# Patient Record
Sex: MALE | Race: BLACK OR AFRICAN AMERICAN | Employment: UNEMPLOYED | URBAN - METROPOLITAN AREA
[De-identification: names, ages, dates, MRNs, and addresses within clinical notes are randomized per-mention and may not be internally consistent; named-entity substitution may affect disease eponyms.]

---

## 2019-09-13 ENCOUNTER — OFFICE VISIT (OUTPATIENT)
Dept: ORTHOPEDIC SURGERY | Age: 29
End: 2019-09-13
Payer: OTHER GOVERNMENT

## 2019-09-13 VITALS
HEART RATE: 89 BPM | WEIGHT: 119.93 LBS | DIASTOLIC BLOOD PRESSURE: 60 MMHG | HEIGHT: 70 IN | SYSTOLIC BLOOD PRESSURE: 107 MMHG | BODY MASS INDEX: 17.17 KG/M2

## 2019-09-13 DIAGNOSIS — M54.6 CHRONIC RIGHT-SIDED THORACIC BACK PAIN: Primary | ICD-10-CM

## 2019-09-13 DIAGNOSIS — M54.5 CHRONIC LEFT-SIDED LOW BACK PAIN, WITH SCIATICA PRESENCE UNSPECIFIED: ICD-10-CM

## 2019-09-13 DIAGNOSIS — G96.191 PERINEURAL CYST: ICD-10-CM

## 2019-09-13 DIAGNOSIS — G89.29 CHRONIC RIGHT-SIDED THORACIC BACK PAIN: Primary | ICD-10-CM

## 2019-09-13 DIAGNOSIS — M79.18 MYOFASCIAL PAIN: ICD-10-CM

## 2019-09-13 DIAGNOSIS — G89.29 CHRONIC LEFT-SIDED LOW BACK PAIN, WITH SCIATICA PRESENCE UNSPECIFIED: ICD-10-CM

## 2019-09-13 PROCEDURE — 99204 OFFICE O/P NEW MOD 45 MIN: CPT | Performed by: PHYSICIAN ASSISTANT

## 2019-09-13 RX ORDER — CYCLOBENZAPRINE HCL 10 MG
10 TABLET ORAL 3 TIMES DAILY PRN
COMMUNITY
End: 2022-06-24

## 2019-09-13 RX ORDER — MELOXICAM 15 MG/1
TABLET ORAL
Qty: 30 TABLET | Refills: 1 | Status: SHIPPED | OUTPATIENT
Start: 2019-09-13

## 2019-09-13 NOTE — PROGRESS NOTES
tenderness at the midline, and trapezius. · Strength: 5/5 bilateral upper extremities   · Special Tests:    ·   Spurling's, L'Hermitte's & Liang's negative bilaterally. · Skin:There are no rashes, ulcerations or lesions in right & left upper extremities. · Reflexes: Bilaterally triceps, biceps and brachioradialis are 2+. Clonus absent bilaterally at the feet. · Additional Examinations:       · RIGHT UPPER EXTREMITY:  Inspection/examination of the right upper extremity does not show any tenderness, deformity or injury. Range of motion is full. There is no gross instability. There are no rashes, ulcerations or lesions. Strength and tone are normal.  · LEFT UPPER EXTREMITY: Inspection/examination of the left upper extremity does not show any tenderness, deformity or injury. Range of motion is full. There is no gross instability. There are no rashes, ulcerations or lesions. Strength and tone are normal.    LUMBAR/SACRAL EXAMINATION:  · Inspection: Local inspection shows no step-off or bruising. Mild scoliosis      · Palpation: He has some right-sided paraspinal muscle tenderness in the thoracolumbar spine. No focal trigger point. No focal tenderness at midline. · Range of Motion: 45 degrees of flexion, 15 degrees extension  · Strength:   Strength testing is 5/5 in all muscle groups tested. · Special Tests:   Straight leg raise and crossed SLR negative. Leg length and pelvis level.  0 out of 5 Surinder's signs. · Skin: There are no rashes, ulcerations or lesions. · Reflexes: Reflexes are symmetrically 1+ at the patellar and ankle tendons with reinforcement. Clonus absent bilaterally at the feet. · Gait & station: Normal unassisted  · Additional Examinations:   · RIGHT LOWER EXTREMITY: Inspection/examination of the right lower extremity does not show any tenderness, deformity or injury. Range of motion is full. There is no gross instability. There are no rashes, ulcerations or lesions.

## 2019-09-16 ENCOUNTER — TELEPHONE (OUTPATIENT)
Dept: ORTHOPEDIC SURGERY | Age: 29
End: 2019-09-16

## 2019-12-16 ENCOUNTER — TELEPHONE (OUTPATIENT)
Dept: ORTHOPEDIC SURGERY | Age: 29
End: 2019-12-16

## 2022-01-08 ENCOUNTER — APPOINTMENT (OUTPATIENT)
Dept: GENERAL RADIOLOGY | Age: 32
End: 2022-01-08
Payer: OTHER GOVERNMENT

## 2022-01-08 ENCOUNTER — APPOINTMENT (OUTPATIENT)
Dept: CT IMAGING | Age: 32
End: 2022-01-08
Payer: OTHER GOVERNMENT

## 2022-01-08 ENCOUNTER — HOSPITAL ENCOUNTER (EMERGENCY)
Age: 32
Discharge: ANOTHER HOSPITAL | End: 2022-01-08
Attending: EMERGENCY MEDICINE
Payer: OTHER GOVERNMENT

## 2022-01-08 VITALS
RESPIRATION RATE: 22 BRPM | DIASTOLIC BLOOD PRESSURE: 75 MMHG | HEART RATE: 76 BPM | OXYGEN SATURATION: 100 % | TEMPERATURE: 97.1 F | SYSTOLIC BLOOD PRESSURE: 99 MMHG

## 2022-01-08 DIAGNOSIS — F12.90 MARIJUANA USE: ICD-10-CM

## 2022-01-08 DIAGNOSIS — E87.20 LACTIC ACIDOSIS: ICD-10-CM

## 2022-01-08 DIAGNOSIS — R56.9 SEIZURE (HCC): Primary | ICD-10-CM

## 2022-01-08 LAB
A/G RATIO: 1.7 (ref 1.1–2.2)
ACETAMINOPHEN LEVEL: <5 UG/ML (ref 10–30)
ALBUMIN SERPL-MCNC: 4.8 G/DL (ref 3.4–5)
ALP BLD-CCNC: 77 U/L (ref 40–129)
ALT SERPL-CCNC: 27 U/L (ref 10–40)
AMORPHOUS: ABNORMAL /HPF
AMPHETAMINE SCREEN, URINE: ABNORMAL
ANION GAP SERPL CALCULATED.3IONS-SCNC: 29 MMOL/L (ref 3–16)
AST SERPL-CCNC: 26 U/L (ref 15–37)
BACTERIA: ABNORMAL /HPF
BARBITURATE SCREEN URINE: ABNORMAL
BASOPHILS ABSOLUTE: 0.1 K/UL (ref 0–0.2)
BASOPHILS RELATIVE PERCENT: 0.5 %
BENZODIAZEPINE SCREEN, URINE: ABNORMAL
BILIRUB SERPL-MCNC: <0.2 MG/DL (ref 0–1)
BILIRUBIN URINE: NEGATIVE
BLOOD, URINE: ABNORMAL
BUN BLDV-MCNC: 12 MG/DL (ref 7–20)
CALCIUM SERPL-MCNC: 9.6 MG/DL (ref 8.3–10.6)
CANNABINOID SCREEN URINE: POSITIVE
CHLORIDE BLD-SCNC: 98 MMOL/L (ref 99–110)
CLARITY: CLEAR
CO2: 14 MMOL/L (ref 21–32)
COCAINE METABOLITE SCREEN URINE: ABNORMAL
COLOR: YELLOW
CREAT SERPL-MCNC: 1 MG/DL (ref 0.9–1.3)
CRYSTALS, UA: ABNORMAL /HPF
EOSINOPHILS ABSOLUTE: 0 K/UL (ref 0–0.6)
EOSINOPHILS RELATIVE PERCENT: 0 %
ETHANOL: NORMAL MG/DL (ref 0–0.08)
GFR AFRICAN AMERICAN: >60
GFR NON-AFRICAN AMERICAN: >60
GLUCOSE BLD-MCNC: 139 MG/DL (ref 70–99)
GLUCOSE URINE: NEGATIVE MG/DL
HCT VFR BLD CALC: 45.2 % (ref 40.5–52.5)
HEMOGLOBIN: 14.2 G/DL (ref 13.5–17.5)
INFLUENZA A: NOT DETECTED
INFLUENZA B: NOT DETECTED
KETONES, URINE: ABNORMAL MG/DL
LACTIC ACID: 17.8 MMOL/L (ref 0.4–2)
LEUKOCYTE ESTERASE, URINE: NEGATIVE
LYMPHOCYTES ABSOLUTE: 0.9 K/UL (ref 1–5.1)
LYMPHOCYTES RELATIVE PERCENT: 4.9 %
Lab: ABNORMAL
MCH RBC QN AUTO: 30 PG (ref 26–34)
MCHC RBC AUTO-ENTMCNC: 31.4 G/DL (ref 31–36)
MCV RBC AUTO: 95.7 FL (ref 80–100)
METHADONE SCREEN, URINE: ABNORMAL
MICROSCOPIC EXAMINATION: YES
MONOCYTES ABSOLUTE: 1 K/UL (ref 0–1.3)
MONOCYTES RELATIVE PERCENT: 5.5 %
MUCUS: ABNORMAL /LPF
NEUTROPHILS ABSOLUTE: 16.2 K/UL (ref 1.7–7.7)
NEUTROPHILS RELATIVE PERCENT: 89.1 %
NITRITE, URINE: NEGATIVE
OPIATE SCREEN URINE: ABNORMAL
OXYCODONE URINE: ABNORMAL
PDW BLD-RTO: 13.3 % (ref 12.4–15.4)
PH UA: 5
PH UA: 5 (ref 5–8)
PHENCYCLIDINE SCREEN URINE: ABNORMAL
PLATELET # BLD: 320 K/UL (ref 135–450)
PMV BLD AUTO: 7.7 FL (ref 5–10.5)
POTASSIUM REFLEX MAGNESIUM: 4 MMOL/L (ref 3.5–5.1)
PROPOXYPHENE SCREEN: ABNORMAL
PROTEIN UA: ABNORMAL MG/DL
RBC # BLD: 4.72 M/UL (ref 4.2–5.9)
RBC UA: ABNORMAL /HPF (ref 0–4)
SALICYLATE, SERUM: <0.3 MG/DL (ref 15–30)
SARS-COV-2 RNA, RT PCR: NOT DETECTED
SODIUM BLD-SCNC: 141 MMOL/L (ref 136–145)
SPECIFIC GRAVITY UA: 1.02 (ref 1–1.03)
TOTAL PROTEIN: 7.6 G/DL (ref 6.4–8.2)
URINE REFLEX TO CULTURE: ABNORMAL
URINE TYPE: ABNORMAL
UROBILINOGEN, URINE: 0.2 E.U./DL
WBC # BLD: 18.2 K/UL (ref 4–11)
WBC UA: ABNORMAL /HPF (ref 0–5)

## 2022-01-08 PROCEDURE — 80179 DRUG ASSAY SALICYLATE: CPT

## 2022-01-08 PROCEDURE — 82077 ASSAY SPEC XCP UR&BREATH IA: CPT

## 2022-01-08 PROCEDURE — 80307 DRUG TEST PRSMV CHEM ANLYZR: CPT

## 2022-01-08 PROCEDURE — 81001 URINALYSIS AUTO W/SCOPE: CPT

## 2022-01-08 PROCEDURE — 99284 EMERGENCY DEPT VISIT MOD MDM: CPT

## 2022-01-08 PROCEDURE — 80143 DRUG ASSAY ACETAMINOPHEN: CPT

## 2022-01-08 PROCEDURE — 80053 COMPREHEN METABOLIC PANEL: CPT

## 2022-01-08 PROCEDURE — 36415 COLL VENOUS BLD VENIPUNCTURE: CPT

## 2022-01-08 PROCEDURE — 71045 X-RAY EXAM CHEST 1 VIEW: CPT

## 2022-01-08 PROCEDURE — 85025 COMPLETE CBC W/AUTO DIFF WBC: CPT

## 2022-01-08 PROCEDURE — 83605 ASSAY OF LACTIC ACID: CPT

## 2022-01-08 PROCEDURE — 96365 THER/PROPH/DIAG IV INF INIT: CPT

## 2022-01-08 PROCEDURE — 6360000002 HC RX W HCPCS

## 2022-01-08 PROCEDURE — 93005 ELECTROCARDIOGRAM TRACING: CPT | Performed by: PHYSICIAN ASSISTANT

## 2022-01-08 PROCEDURE — 80177 DRUG SCRN QUAN LEVETIRACETAM: CPT

## 2022-01-08 PROCEDURE — 6360000002 HC RX W HCPCS: Performed by: PHYSICIAN ASSISTANT

## 2022-01-08 PROCEDURE — 2580000003 HC RX 258: Performed by: PHYSICIAN ASSISTANT

## 2022-01-08 PROCEDURE — 96375 TX/PRO/DX INJ NEW DRUG ADDON: CPT

## 2022-01-08 PROCEDURE — 87636 SARSCOV2 & INF A&B AMP PRB: CPT

## 2022-01-08 PROCEDURE — 70450 CT HEAD/BRAIN W/O DYE: CPT

## 2022-01-08 RX ORDER — LORAZEPAM 2 MG/ML
1 INJECTION INTRAMUSCULAR ONCE
Status: COMPLETED | OUTPATIENT
Start: 2022-01-08 | End: 2022-01-08

## 2022-01-08 RX ORDER — LORAZEPAM 2 MG/ML
INJECTION INTRAMUSCULAR
Status: COMPLETED
Start: 2022-01-08 | End: 2022-01-08

## 2022-01-08 RX ORDER — 0.9 % SODIUM CHLORIDE 0.9 %
1000 INTRAVENOUS SOLUTION INTRAVENOUS ONCE
Status: COMPLETED | OUTPATIENT
Start: 2022-01-08 | End: 2022-01-08

## 2022-01-08 RX ADMIN — LORAZEPAM 1 MG: 2 INJECTION INTRAMUSCULAR; INTRAVENOUS at 12:49

## 2022-01-08 RX ADMIN — LORAZEPAM 1 MG: 2 INJECTION INTRAMUSCULAR at 12:49

## 2022-01-08 RX ADMIN — LEVETIRACETAM 1000 MG: 100 INJECTION, SOLUTION INTRAVENOUS at 14:37

## 2022-01-08 RX ADMIN — SODIUM CHLORIDE 1000 ML: 9 INJECTION, SOLUTION INTRAVENOUS at 14:38

## 2022-01-08 NOTE — ED PROVIDER NOTES
I independently examined and evaluated Damien Bobo. All diagnostic, treatment, and disposition decisions were made by myself in conjunction with the MAX, Syed Padilla. For all further details of the patient's emergency department visit, please see their documentation. 53-year-old male presents after a witnessed seizure at home. He apparently has a history of PTSD and is on some benzos and Keppra, normally followed by the [place]. He apparently was complaining of a headache today and then had a witnessed seizure. He was combative and postictal when EMS arrived but did improve in route. Shortly after arrival here he had another witnessed seizure and was postictal afterwards. Vitals:    01/08/22 1500   BP: 108/67   Pulse: 104   Resp: 24   Temp:    SpO2: 98%     At the time of my exam the patient is postictal, confused, somnolent. He is tachycardic. No hypoxia.     Results for orders placed or performed during the hospital encounter of 01/08/22   COVID-19 & Influenza Combo    Specimen: Nasopharyngeal Swab   Result Value Ref Range    SARS-CoV-2 RNA, RT PCR NOT DETECTED NOT DETECTED    INFLUENZA A NOT DETECTED NOT DETECTED    INFLUENZA B NOT DETECTED NOT DETECTED   CBC auto differential   Result Value Ref Range    WBC 18.2 (H) 4.0 - 11.0 K/uL    RBC 4.72 4.20 - 5.90 M/uL    Hemoglobin 14.2 13.5 - 17.5 g/dL    Hematocrit 45.2 40.5 - 52.5 %    MCV 95.7 80.0 - 100.0 fL    MCH 30.0 26.0 - 34.0 pg    MCHC 31.4 31.0 - 36.0 g/dL    RDW 13.3 12.4 - 15.4 %    Platelets 499 914 - 853 K/uL    MPV 7.7 5.0 - 10.5 fL    Neutrophils % 89.1 %    Lymphocytes % 4.9 %    Monocytes % 5.5 %    Eosinophils % 0.0 %    Basophils % 0.5 %    Neutrophils Absolute 16.2 (H) 1.7 - 7.7 K/uL    Lymphocytes Absolute 0.9 (L) 1.0 - 5.1 K/uL    Monocytes Absolute 1.0 0.0 - 1.3 K/uL    Eosinophils Absolute 0.0 0.0 - 0.6 K/uL    Basophils Absolute 0.1 0.0 - 0.2 K/uL   Comprehensive Metabolic Panel w/ Reflex to MG   Result Value Ref Range Sodium 141 136 - 145 mmol/L    Potassium reflex Magnesium 4.0 3.5 - 5.1 mmol/L    Chloride 98 (L) 99 - 110 mmol/L    CO2 14 (LL) 21 - 32 mmol/L    Anion Gap 29 (H) 3 - 16    Glucose 139 (H) 70 - 99 mg/dL    BUN 12 7 - 20 mg/dL    CREATININE 1.0 0.9 - 1.3 mg/dL    GFR Non-African American >60 >60    GFR African American >60 >60    Calcium 9.6 8.3 - 10.6 mg/dL    Total Protein 7.6 6.4 - 8.2 g/dL    Albumin 4.8 3.4 - 5.0 g/dL    Albumin/Globulin Ratio 1.7 1.1 - 2.2    Total Bilirubin <0.2 0.0 - 1.0 mg/dL    Alkaline Phosphatase 77 40 - 129 U/L    ALT 27 10 - 40 U/L    AST 26 15 - 37 U/L   Levetiracetam level   Result Value Ref Range    KEPPRA Dose Amt Unknown    Lactic acid, plasma   Result Value Ref Range    Lactic Acid 17.8 (HH) 0.4 - 2.0 mmol/L   Ethanol   Result Value Ref Range    Ethanol Lvl None Detected mg/dL   Salicylate   Result Value Ref Range    Salicylate, Serum <3.2 (L) 15.0 - 30.0 mg/dL   Acetaminophen level   Result Value Ref Range    Acetaminophen Level <5 (L) 10 - 30 ug/mL       CT HEAD WO CONTRAST   Final Result   1. No acute intracranial abnormality. XR CHEST PORTABLE   Final Result   1. No acute abnormality. Here his lab work shows leukocytosis and elevated lactic acid, consistent with recent seizure. He was given IV Ativan just after his seizure episode here. He is also been given IV Keppra. Head CT is normal, chest x-ray is normal.  His family is here and they deny any history of seizure. They state that he comes and goes at will so they are unclear of any potential ingestions or drug and alcohol use. He will need to be admitted and is a patient of the [facility]. We are attempting to arrange transfer to the [facility] at this time. Urine drug screen is pending at this time as well.          Merrill York MD  01/08/22 2136

## 2022-01-08 NOTE — ED NOTES
1517- Call was placed to transfer center for neurology at [facility], spoke with [facility], [address]  01/08/22 731 1512- Call placed to [facility], info was given, transfer center was updated      Noa March 01/08/22 100 [facility] hospitalist returned call, Dr. Viola Bryant, spoke with [facility], [address] [place] [address] neurologist, Dr. Charmayne Gerold, returned call and spoke with [facility],  [address]- PT was accepted by Dr. Mariela Macario  01/08/22 [phone]

## 2022-01-08 NOTE — ED NOTES
Pt has [facility] benefits and coverage for Transport, [facility] will be setting up transport.        Luann Garcia  01/08/22 6211

## 2022-01-08 NOTE — ED NOTES
0077- call was placed to [facility] to update, gave transport needs       Shabnam Howell  01/08/22 [address]  01/08/22 7038

## 2022-01-08 NOTE — ED NOTES
Writer has attempted to put pulse ox on pt multiple times. Pt has increased motor activity and will not leave pulse oximeter on. Pt has been observed by writer to be breathing evenly and without difficulty.      Dilshad Villagran RN  01/08/22 9971

## 2022-01-08 NOTE — ED PROVIDER NOTES
tablet by mouth every 6 hours as needed. IBUPROFEN (ADVIL;MOTRIN) 200 MG CAPS    Take 1 capsule by mouth. MELOXICAM (MOBIC) 15 MG TABLET    I po qd PRN         ALLERGIES     Patient has no known allergies. FAMILYHISTORY     No family history on file. SOCIAL HISTORY       Social History     Tobacco Use    Smoking status: Current Some Day Smoker     Packs/day: 0.50    Smokeless tobacco: Current User    Tobacco comment: in process of quiting   Substance Use Topics    Alcohol use: Not on file    Drug use: Not on file       SCREENINGS             PHYSICAL EXAM    (up to 7 for level 4, 8 or more for level 5)     ED Triage Vitals   BP Temp Temp src Pulse Resp SpO2 Height Weight   -- -- -- -- -- -- -- --       Physical Exam  Vitals and nursing note reviewed. Constitutional:       General: He is not in acute distress. Appearance: Normal appearance. He is well-developed and normal weight. He is not ill-appearing, toxic-appearing or diaphoretic. HENT:      Head: Normocephalic and atraumatic. Nose: Nose normal.   Eyes:      General:         Right eye: No discharge. Left eye: No discharge. Cardiovascular:      Rate and Rhythm: Normal rate and regular rhythm. Pulses:           Radial pulses are 2+ on the right side and 2+ on the left side. Heart sounds: Normal heart sounds. No murmur heard. No gallop. Pulmonary:      Effort: Pulmonary effort is normal. No respiratory distress. Breath sounds: Normal breath sounds. No wheezing or rales. Chest:      Chest wall: No tenderness. Musculoskeletal:         General: No deformity. Normal range of motion. Cervical back: Normal range of motion and neck supple. Right lower leg: No edema. Left lower leg: No edema. Skin:     General: Skin is warm and dry. Neurological:      General: No focal deficit present. Mental Status: He is lethargic and confused. GCS: GCS eye subscore is 4.  GCS verbal subscore is 5. GCS motor subscore is 6. Cranial Nerves: Cranial nerves are intact. Sensory: Sensation is intact. Motor: Motor function is intact. Coordination: Coordination is intact. Gait: Gait is intact. Comments: Post-ictal. Awake but drowsy. Patient in confused without focal neuro deficits. Psychiatric:         Behavior: Behavior normal. Behavior is cooperative.          DIAGNOSTIC RESULTS   LABS:    Labs Reviewed   CBC WITH AUTO DIFFERENTIAL - Abnormal; Notable for the following components:       Result Value    WBC 18.2 (*)     Neutrophils Absolute 16.2 (*)     Lymphocytes Absolute 0.9 (*)     All other components within normal limits    Narrative:     Performed at:  [facility],  [employer]   Phone [phone]   COMPREHENSIVE METABOLIC PANEL W/ REFLEX TO MG FOR LOW K - Abnormal; Notable for the following components:    Chloride 98 (*)     CO2 14 (*)     Anion Gap 29 (*)     Glucose 139 (*)     All other components within normal limits    Narrative:     [address]  [facility] tel. [phone],  Chemistry results called to and read back by Bryn Diaz RN, 01/08/2022  13:25, by Carol Weller  Performed at:  [place] [address],  [employer], [employer]   Phone [phone]   [address] - Abnormal; Notable for the following components:    Cannabinoid Scrn, Ur POSITIVE (*)     All other components within normal limits    Narrative:     Performed at:  [facility]) - [place] [address],  [email or web address]   Phone [phone]   URINE RT REFLEX TO CULTURE - Abnormal; Notable for the following components:    Ketones, Urine TRACE (*)     Blood, Urine SMALL (*)     Protein, UA TRACE (*)     All other components within normal limits    Narrative:     Performed at:  [facility]) - [facility],  [employer]   Phone [phone]   LACTIC ACID, PLASMA - Abnormal; Notable for the following components:    Lactic Acid 17.8 (*)     All other components within normal limits    Narrative:     Zach Deluca  [facility] tel. [phone],  Called to Randi Calixto RN @ 5661, 01/08/2022 13:34, by Marinda Felty  Performed at:  [facility],  [place], [employer]   Phone [phone]   SALICYLATE LEVEL - Abnormal; Notable for the following components:    Salicylate, Serum <8.2 (*)     All other components within normal limits    Narrative:     Chun Weber tel. [phone],  Chemistry results called to and read back by Addie Khalil RN, 01/08/2022  13:25, by Carlos Agustin  Performed at:  [place] [address],  [place], [employer]   Phone [phone]   ACETAMINOPHEN LEVEL - Abnormal; Notable for the following components:    Acetaminophen Level <5 (*)     All other components within normal limits    Narrative:     Performed at:  [facility] [address],  [place], [employer]   Phone [phone]   MICROSCOPIC URINALYSIS - Abnormal; Notable for the following components:    Mucus, UA 1+ (*)     Bacteria, UA 2+ (*)     Crystals, UA 3+ Uric Acid (*)     All other components within normal limits    Narrative:     Performed at:  [place] [address],  [place], [employer]   Phone [phone]    Narrative:     Performed at:  [place] [address],  [place], [employer]   Phone [phone]   LEVETIRACETAM LEVEL    Narrative:     Performed at:  [facility]  [address] [place], [place] [address]   Phone [phone]   ETHANOL    Narrative:     Chun Weber tel. [phone],  Chemistry results called to and read back by Addie Khalil RN, 01/08/2022  13:25, by Carlos Agustin  Performed at:  [facility]  [address],  [place], [place]   Phone [phone]       When ordered only abnormal lab results are displayed. All other labs were within normal range or not returned as of this dictation. EKG: When ordered, EKG's are interpreted by the Emergency Department Physician in the absence of a cardiologist.  Please see their note for interpretation of EKG. RADIOLOGY:   Non-plain film images such as CT, Ultrasound and MRI are read by the radiologist. Plain radiographic images are visualized and preliminarily interpreted by the ED Provider with the below findings:        Interpretation per the Radiologist below, if available at the time of this note:    CT HEAD WO CONTRAST   Final Result   1. No acute intracranial abnormality. XR CHEST PORTABLE   Final Result   1. No acute abnormality. No results found. PROCEDURES   Unless otherwise noted below, none     Procedures    CRITICAL CARE TIME   N/A    CONSULTS:  IP CONSULT TO NEUROLOGY      EMERGENCY DEPARTMENT COURSE and DIFFERENTIAL DIAGNOSIS/MDM:   Vitals:    Vitals:    01/08/22 1600 01/08/22 1630 01/08/22 1730 01/08/22 1803   BP:  107/71 133/70 120/81   Pulse: 72 75 75    Resp: 26      Temp:       TempSrc:       SpO2:           Patient was given the following medications:  Medications   levETIRAcetam (KEPPRA) 1,000 mg in sodium chloride 0.9 % 100 mL IVPB (0 mg IntraVENous Stopped 1/8/22 1545)   LORazepam (ATIVAN) injection 1 mg (1 mg IntraVENous Given 1/8/22 1249)   0.9 % sodium chloride bolus (0 mLs IntraVENous Stopped 1/8/22 1724)           Patient brought in today by EMS with complaints of possible seizure activity. On exam patient is postictal therefore it is difficult to obtain history. Prior to arrival he received Zofran and Narcan. He appears postictal he is afebrile breathing on room air satting at 98%. Old labs and records reviewed. Patient seen by myself as well as my attending.     On my arrival to the room the patient did appear to have a seizure activity. Lasted about 15 to 20 seconds. He appears post ictal.  He was given 1 of Ativan. He has a white count of 18 hemoglobin of 14.2. No acute electrolyte abnormalities. CO2 14. Anion gap of 29. Blood glucose of 139. Lactic acid of 17.8. Ethanol is negative. Salicylate levels negative. Chest x-ray shows no acute abnormality. Acetaminophen levels negative. CT head shows no acute intracranial abnormality. COVID is negative. FLU is negative. Urine and urine drug screen is currently pending at this time. I spoke to [place] hospitalist initially Dr. Italia Sesay who recommended talking to neurology as patient most likely can be a neurology admit. I spoke to Dr. Thanh Dawson with neurology who did accept this patient. He would like us to obtain an EKG. We'll also get an EKG. Plan at this time will be to transfer to the [facility] for neurology evaluation. Patient stable at time of transfer. Urine drug screen positive for cannabis. Urine negative for infection trace proteins and trace ketones noted. FINAL IMPRESSION      1. Seizure ([address]    2. Lactic acidosis    3. Marijuana use          DISPOSITION/PLAN   DISPOSITION        PATIENT REFERRED TO:  No follow-up provider specified.     DISCHARGE MEDICATIONS:  New Prescriptions    No medications on file       DISCONTINUED MEDICATIONS:  Discontinued Medications    No medications on file              (Please note that portions of this note were completed with a voice recognition program.  Efforts were made to edit the dictations but occasionally words are mis-transcribed.)    Kelvin Nicole PA-C (electronically signed)            Kelvin Nicole PA-C  01/08/22 1320 [facility],[address], [place]  01/08/22 Ivelisse Benitez PA-C  01/08/22 3614

## 2022-01-08 NOTE — ED NOTES
Bed: 03  Expected date:   Expected time:   Means of arrival:   Comments:  mo Purcell, [facility]  01/08/22 1242

## 2022-01-09 LAB
EKG ATRIAL RATE: 86 BPM
EKG DIAGNOSIS: NORMAL
EKG P AXIS: 57 DEGREES
EKG P-R INTERVAL: 142 MS
EKG Q-T INTERVAL: 350 MS
EKG QRS DURATION: 90 MS
EKG QTC CALCULATION (BAZETT): 418 MS
EKG R AXIS: 14 DEGREES
EKG T AXIS: 72 DEGREES
EKG VENTRICULAR RATE: 86 BPM
KEPPRA DOSE AMT: ABNORMAL
KEPPRA: <2 UG/ML (ref 6–46)

## 2022-01-09 PROCEDURE — 93010 ELECTROCARDIOGRAM REPORT: CPT | Performed by: INTERNAL MEDICINE

## 2022-01-09 NOTE — ED NOTES
Pt's family notified transport to be here at 2230 d/t cancellation. Family sts understanding.       Olaf Galvin RN  01/08/22 5735

## 2022-01-09 NOTE — ED NOTES
Pt resting in bed, confused and sleepy. Pt on monitor. Seizure pads in place. Bed low and locked. Call light in reach of family. Family in room. Side rails up x up. Family worried about \"microseizures. \" No seizure activity noted at this time. Will monitor.        Gennaro Hawkins RN  01/08/22 6676

## 2022-01-09 NOTE — ED NOTES
Family notified Pt has room, [facility], Room 657 and that transport to be here at midnight.       Andrew Menendez RN  01/08/22 8163

## 2022-06-24 ENCOUNTER — APPOINTMENT (OUTPATIENT)
Dept: CT IMAGING | Age: 32
End: 2022-06-24
Payer: OTHER GOVERNMENT

## 2022-06-24 ENCOUNTER — HOSPITAL ENCOUNTER (EMERGENCY)
Age: 32
Discharge: HOME | End: 2022-06-24
Attending: EMERGENCY MEDICINE
Payer: OTHER GOVERNMENT

## 2022-06-24 VITALS
BODY MASS INDEX: 18.51 KG/M2 | DIASTOLIC BLOOD PRESSURE: 50 MMHG | HEIGHT: 69 IN | HEART RATE: 56 BPM | SYSTOLIC BLOOD PRESSURE: 100 MMHG | RESPIRATION RATE: 18 BRPM | TEMPERATURE: 97 F | OXYGEN SATURATION: 100 % | WEIGHT: 125 LBS

## 2022-06-24 DIAGNOSIS — Z79.899 SEIZURE SECONDARY TO SUBTHERAPEUTIC ANTICONVULSANT MEDICATION (HCC): ICD-10-CM

## 2022-06-24 DIAGNOSIS — S00.83XA CONTUSION OF FACE, INITIAL ENCOUNTER: ICD-10-CM

## 2022-06-24 DIAGNOSIS — G40.919 BREAKTHROUGH SEIZURE (HCC): Primary | ICD-10-CM

## 2022-06-24 DIAGNOSIS — R56.9 SEIZURE SECONDARY TO SUBTHERAPEUTIC ANTICONVULSANT MEDICATION (HCC): ICD-10-CM

## 2022-06-24 LAB
A/G RATIO: 2.1 (ref 1.1–2.2)
ALBUMIN SERPL-MCNC: 5.2 G/DL (ref 3.4–5)
ALP BLD-CCNC: 70 U/L (ref 40–129)
ALT SERPL-CCNC: 21 U/L (ref 10–40)
ANION GAP SERPL CALCULATED.3IONS-SCNC: 19 MMOL/L (ref 3–16)
AST SERPL-CCNC: 25 U/L (ref 15–37)
BASOPHILS ABSOLUTE: 0.1 K/UL (ref 0–0.2)
BASOPHILS RELATIVE PERCENT: 0.5 %
BILIRUB SERPL-MCNC: 0.3 MG/DL (ref 0–1)
BUN BLDV-MCNC: 7 MG/DL (ref 7–20)
CALCIUM SERPL-MCNC: 10 MG/DL (ref 8.3–10.6)
CHLORIDE BLD-SCNC: 101 MMOL/L (ref 99–110)
CO2: 17 MMOL/L (ref 21–32)
CREAT SERPL-MCNC: 0.9 MG/DL (ref 0.9–1.3)
EOSINOPHILS ABSOLUTE: 0.1 K/UL (ref 0–0.6)
EOSINOPHILS RELATIVE PERCENT: 0.5 %
GFR AFRICAN AMERICAN: >60
GFR NON-AFRICAN AMERICAN: >60
GLUCOSE BLD-MCNC: 91 MG/DL (ref 70–99)
HCT VFR BLD CALC: 44.9 % (ref 40.5–52.5)
HEMOGLOBIN: 14.5 G/DL (ref 13.5–17.5)
LYMPHOCYTES ABSOLUTE: 1.9 K/UL (ref 1–5.1)
LYMPHOCYTES RELATIVE PERCENT: 16.9 %
MCH RBC QN AUTO: 29.6 PG (ref 26–34)
MCHC RBC AUTO-ENTMCNC: 32.4 G/DL (ref 31–36)
MCV RBC AUTO: 91.4 FL (ref 80–100)
MONOCYTES ABSOLUTE: 0.7 K/UL (ref 0–1.3)
MONOCYTES RELATIVE PERCENT: 6.3 %
NEUTROPHILS ABSOLUTE: 8.6 K/UL (ref 1.7–7.7)
NEUTROPHILS RELATIVE PERCENT: 75.8 %
PDW BLD-RTO: 13.4 % (ref 12.4–15.4)
PLATELET # BLD: 305 K/UL (ref 135–450)
PLATELET SLIDE REVIEW: ABNORMAL
PMV BLD AUTO: 8.2 FL (ref 5–10.5)
POTASSIUM REFLEX MAGNESIUM: 5.1 MMOL/L (ref 3.5–5.1)
RBC # BLD: 4.91 M/UL (ref 4.2–5.9)
REASON FOR REJECTION: NORMAL
REJECTED TEST: NORMAL
SLIDE REVIEW: ABNORMAL
SODIUM BLD-SCNC: 137 MMOL/L (ref 136–145)
TOTAL PROTEIN: 7.7 G/DL (ref 6.4–8.2)
VALPROIC ACID LEVEL: <2.8 UG/ML (ref 50–100)
WBC # BLD: 11.3 K/UL (ref 4–11)

## 2022-06-24 PROCEDURE — 96374 THER/PROPH/DIAG INJ IV PUSH: CPT

## 2022-06-24 PROCEDURE — 6370000000 HC RX 637 (ALT 250 FOR IP): Performed by: EMERGENCY MEDICINE

## 2022-06-24 PROCEDURE — 80164 ASSAY DIPROPYLACETIC ACD TOT: CPT

## 2022-06-24 PROCEDURE — 70450 CT HEAD/BRAIN W/O DYE: CPT

## 2022-06-24 PROCEDURE — 80053 COMPREHEN METABOLIC PANEL: CPT

## 2022-06-24 PROCEDURE — 85025 COMPLETE CBC W/AUTO DIFF WBC: CPT

## 2022-06-24 PROCEDURE — 99284 EMERGENCY DEPT VISIT MOD MDM: CPT

## 2022-06-24 PROCEDURE — 36415 COLL VENOUS BLD VENIPUNCTURE: CPT

## 2022-06-24 PROCEDURE — 6360000002 HC RX W HCPCS: Performed by: EMERGENCY MEDICINE

## 2022-06-24 RX ORDER — DIVALPROEX SODIUM 500 MG/1
500 TABLET, DELAYED RELEASE ORAL 2 TIMES DAILY
COMMUNITY

## 2022-06-24 RX ORDER — LORAZEPAM 2 MG/ML
1 INJECTION INTRAMUSCULAR ONCE
Status: COMPLETED | OUTPATIENT
Start: 2022-06-24 | End: 2022-06-24

## 2022-06-24 RX ORDER — DIAZEPAM 2 MG/1
2 TABLET ORAL EVERY 12 HOURS
COMMUNITY
End: 2022-07-05

## 2022-06-24 RX ORDER — DIVALPROEX SODIUM 500 MG/1
500 TABLET, DELAYED RELEASE ORAL ONCE
Status: COMPLETED | OUTPATIENT
Start: 2022-06-24 | End: 2022-06-24

## 2022-06-24 RX ADMIN — DIVALPROEX SODIUM 500 MG: 500 TABLET, DELAYED RELEASE ORAL at 15:00

## 2022-06-24 RX ADMIN — LORAZEPAM 1 MG: 2 INJECTION INTRAMUSCULAR; INTRAVENOUS at 12:26

## 2022-06-24 ASSESSMENT — PAIN DESCRIPTION - DESCRIPTORS: DESCRIPTORS: ACHING

## 2022-06-24 ASSESSMENT — PAIN DESCRIPTION - ORIENTATION: ORIENTATION: LEFT

## 2022-06-24 ASSESSMENT — PAIN DESCRIPTION - LOCATION: LOCATION: OTHER (COMMENT);HEAD

## 2022-06-24 ASSESSMENT — PAIN SCALES - GENERAL: PAINLEVEL_OUTOF10: 3

## 2022-06-24 NOTE — ED PROVIDER NOTES
CHIEF COMPLAINT  Seizures (seizure at home hit head, bite tonuge on right and hot left eye)      HISTORY OF PRESENT ILLNESS  Gato Mckenna is a 36 y.o. male with a history of PTSD and seizure disorder who presents to the ED complaining of seizure prior to arrival.  States he is prescribed Depakote and olanzapine. Reports seizure in the bathroom causing him to fall and hit his head prior to arrival.  Has pain above his left thigh and reports having bitten the right side of his tongue as well as his left lower lip. Denies recent illness or trauma. Claims to be compliant with his medications but is somewhat evasive about it. No report of chest pain, cough, dyspnea, fever, emesis, diarrhea, dysuria. No other complaints, modifying factors or associated symptoms. I have reviewed the following from the nursing documentation. Past Medical History:   Diagnosis Date    Seizures ([facility] [address]      History reviewed. No pertinent surgical history. History reviewed. No pertinent family history.   Social History     Socioeconomic History    Marital status:      Spouse name: Not on file    Number of children: Not on file    Years of education: Not on file    Highest education level: Not on file   Occupational History    Not on file   Tobacco Use    Smoking status: Current Some Day Smoker     Packs/day: 0.50    Smokeless tobacco: Current User    Tobacco comment: in process of quiting   Vaping Use    Vaping Use: Never used   Substance and Sexual Activity    Alcohol use: Not Currently    Drug use: Not Currently    Sexual activity: Yes   Other Topics Concern    Not on file   Social History Narrative    Not on file     Social Determinants of Health     Financial Resource Strain:     Difficulty of Paying Living Expenses: Not on file   Food Insecurity:     Worried About Running Out of Food in the Last Year: Not on file    [place] of Food in the Last Year: Not on file   Transportation Needs:     Lack of Transportation (Medical): Not on file    Lack of Transportation (Non-Medical): Not on file   Physical Activity:     Days of Exercise per Week: Not on file    Minutes of Exercise per Session: Not on file   Stress:     Feeling of Stress : Not on file   Social Connections:     Frequency of Communication with Friends and Family: Not on file    Frequency of Social Gatherings with Friends and Family: Not on file    Attends Spiritism Services: Not on file    Active Member of [address] [place] [address] [employer] or Organizations: Not on file    Attends Club or Organization Meetings: Not on file    Marital Status: Not on file   Intimate Partner Violence:     Fear of Current or Ex-Partner: Not on file    Emotionally Abused: Not on file    Physically Abused: Not on file    Sexually Abused: Not on file   Housing Stability:     Unable to Pay for Housing in the Last Year: Not on file    Number of [place] in the Last Year: Not on file    Unstable Housing in the Last Year: Not on file     No current facility-administered medications for this encounter. Current Outpatient Medications   Medication Sig Dispense Refill    divalproex (DEPAKOTE) 500 MG DR tablet Take 2,000 mg by mouth daily      diazePAM (VALIUM) 2 MG tablet Take 2 mg by mouth every 12 hours.  meloxicam (MOBIC) 15 MG tablet I po qd PRN 30 tablet 1     No Known Allergies    REVIEW OF SYSTEMS  10 systems reviewed, pertinent positives per HPI otherwise noted to be negative. PHYSICAL EXAM  BP (!) 94/54   Pulse 57   Temp 97 °F (36.1 °C)   Resp 18   SpO2 100%   GENERAL APPEARANCE: Awake and alert. Cooperative. No acute distress. HEAD: Normocephalic. Mild swelling and ecchymosis to the left eyebrow. EYES: PERRL. EOM's grossly intact. No abnormal nystagmus  ENT: Mucous membranes are moist.  Small bite wound to the left lower lip as well as to the right side of the tongue. No active bleeding. NECK: Supple, trachea midline. HEART: RRR. Normal S1, S2.  No murmurs, rubs or gallops. LUNGS: Respirations unlabored. CTAB. Good air exchange. No wheezes, rales, or rhonchi. Speaking comfortably in full sentences. ABDOMEN: Soft. Non-distended. Non-tender. No guarding or rebound. Normal Bowel sounds. EXTREMITIES: No peripheral edema. MAEE. No acute deformities. SKIN: Warm and dry. No acute rashes. NEUROLOGICAL: Alert and oriented X 3. CN II-XII intact. No gross facial drooping. Strength 5/5, sensation intact. No pronator drift. Normal coordination. PSYCHIATRIC: Normal mood and affect. LABS  I have reviewed all labs for this visit.    Results for orders placed or performed during the hospital encounter of 06/24/22   Comprehensive Metabolic Panel w/ Reflex to MG   Result Value Ref Range    Sodium 137 136 - 145 mmol/L    Potassium reflex Magnesium 5.1 3.5 - 5.1 mmol/L    Chloride 101 99 - 110 mmol/L    CO2 17 (L) 21 - 32 mmol/L    Anion Gap 19 (H) 3 - 16    Glucose 91 70 - 99 mg/dL    BUN 7 7 - 20 mg/dL    CREATININE 0.9 0.9 - 1.3 mg/dL    GFR Non-African American >60 >60    GFR African American >60 >60    Calcium 10.0 8.3 - 10.6 mg/dL    Total Protein 7.7 6.4 - 8.2 g/dL    Albumin 5.2 (H) 3.4 - 5.0 g/dL    Albumin/Globulin Ratio 2.1 1.1 - 2.2    Total Bilirubin 0.3 0.0 - 1.0 mg/dL    Alkaline Phosphatase 70 40 - 129 U/L    ALT 21 10 - 40 U/L    AST 25 15 - 37 U/L   Valproic Acid Level, Total   Result Value Ref Range    Valproic Acid Lvl <2.8 (L) 50.0 - 100.0 ug/mL   SPECIMEN REJECTION   Result Value Ref Range    Rejected Test CBCWD     Reason for Rejection see below    CBC with Auto Differential   Result Value Ref Range    WBC 11.3 (H) 4.0 - 11.0 K/uL    RBC 4.91 4.20 - 5.90 M/uL    Hemoglobin 14.5 13.5 - 17.5 g/dL    Hematocrit 44.9 40.5 - 52.5 %    MCV 91.4 80.0 - 100.0 fL    MCH 29.6 26.0 - 34.0 pg    MCHC 32.4 31.0 - 36.0 g/dL    RDW 13.4 12.4 - 15.4 %    Platelets 851 406 - 404 K/uL    MPV 8.2 5.0 - 10.5 fL    PLATELET SLIDE REVIEW Clumped     SLIDE REVIEW see below Neutrophils % 75.8 %    Lymphocytes % 16.9 %    Monocytes % 6.3 %    Eosinophils % 0.5 %    Basophils % 0.5 %    Neutrophils Absolute 8.6 (H) 1.7 - 7.7 K/uL    Lymphocytes Absolute 1.9 1.0 - 5.1 K/uL    Monocytes Absolute 0.7 0.0 - 1.3 K/uL    Eosinophils Absolute 0.1 0.0 - 0.6 K/uL    Basophils Absolute 0.1 0.0 - 0.2 K/uL           RADIOLOGY  X-RAYS:  I have reviewed radiologic plain film image(s). ALL OTHER NON-PLAIN FILM IMAGES SUCH AS CT, ULTRASOUND AND MRI HAVE BEEN READ BY THE RADIOLOGIST. CT Head WO Contrast   Final Result   No acute intracranial abnormality. Rechecks: Physical assessment performed. Resting comfortably. No further seizure activity in the ER. ED COURSE/MDM  Patient seen and evaluated. Old records reviewed. Labs and imaging reviewed and results discussed with patient. Patient with breakthrough seizure activity prior to arrival.  Valproic acid level undetectable. Suspect medication noncompliance. Patient somewhat evasive about this. Encouraged to take all of his prescribed medications and follow-up with his neurologist and PCP. New Prescriptions    No medications on file       CLINICAL IMPRESSION  1. Breakthrough seizure ([address]    2. Seizure secondary to subtherapeutic anticonvulsant medication ([address]    3. Contusion of face, initial encounter        Blood pressure (!) 94/54, pulse 57, temperature 97 °F (36.1 °C), resp. rate 18, SpO2 100 %. Leigh Callejas was discharged to home in stable condition.         Blanquita Andrade MD  06/24/22 [id number]

## 2022-07-05 ENCOUNTER — HOSPITAL ENCOUNTER (INPATIENT)
Age: 32
LOS: 1 days | Discharge: HOME | DRG: 885 | End: 2022-07-06
Attending: EMERGENCY MEDICINE | Admitting: PSYCHIATRY & NEUROLOGY
Payer: OTHER GOVERNMENT

## 2022-07-05 DIAGNOSIS — R44.3 HALLUCINATIONS: Primary | ICD-10-CM

## 2022-07-05 PROBLEM — F29 PSYCHOSIS, UNSPECIFIED PSYCHOSIS TYPE (HCC): Status: ACTIVE | Noted: 2022-07-05

## 2022-07-05 LAB
A/G RATIO: 2.1 (ref 1.1–2.2)
ACETAMINOPHEN LEVEL: <5 UG/ML (ref 10–30)
ALBUMIN SERPL-MCNC: 5.1 G/DL (ref 3.4–5)
ALP BLD-CCNC: 69 U/L (ref 40–129)
ALT SERPL-CCNC: 14 U/L (ref 10–40)
ANION GAP SERPL CALCULATED.3IONS-SCNC: 15 MMOL/L (ref 3–16)
AST SERPL-CCNC: 27 U/L (ref 15–37)
BASOPHILS ABSOLUTE: 0.1 K/UL (ref 0–0.2)
BASOPHILS RELATIVE PERCENT: 0.8 %
BILIRUB SERPL-MCNC: 0.4 MG/DL (ref 0–1)
BUN BLDV-MCNC: 5 MG/DL (ref 7–20)
CALCIUM SERPL-MCNC: 9.6 MG/DL (ref 8.3–10.6)
CHLORIDE BLD-SCNC: 97 MMOL/L (ref 99–110)
CO2: 25 MMOL/L (ref 21–32)
CREAT SERPL-MCNC: 1.4 MG/DL (ref 0.9–1.3)
EOSINOPHILS ABSOLUTE: 0.2 K/UL (ref 0–0.6)
EOSINOPHILS RELATIVE PERCENT: 2.8 %
ETHANOL: NORMAL MG/DL (ref 0–0.08)
GFR AFRICAN AMERICAN: >60
GFR NON-AFRICAN AMERICAN: 60
GLUCOSE BLD-MCNC: 120 MG/DL (ref 70–99)
HCT VFR BLD CALC: 37.4 % (ref 40.5–52.5)
HEMOGLOBIN: 12.9 G/DL (ref 13.5–17.5)
LYMPHOCYTES ABSOLUTE: 1.6 K/UL (ref 1–5.1)
LYMPHOCYTES RELATIVE PERCENT: 19.2 %
MAGNESIUM: 1.9 MG/DL (ref 1.8–2.4)
MCH RBC QN AUTO: 30.4 PG (ref 26–34)
MCHC RBC AUTO-ENTMCNC: 34.4 G/DL (ref 31–36)
MCV RBC AUTO: 88.3 FL (ref 80–100)
MONOCYTES ABSOLUTE: 1.1 K/UL (ref 0–1.3)
MONOCYTES RELATIVE PERCENT: 13.4 %
NEUTROPHILS ABSOLUTE: 5.2 K/UL (ref 1.7–7.7)
NEUTROPHILS RELATIVE PERCENT: 63.8 %
PDW BLD-RTO: 12.8 % (ref 12.4–15.4)
PLATELET # BLD: 345 K/UL (ref 135–450)
PMV BLD AUTO: 7 FL (ref 5–10.5)
POTASSIUM REFLEX MAGNESIUM: 3.5 MMOL/L (ref 3.5–5.1)
RBC # BLD: 4.24 M/UL (ref 4.2–5.9)
SALICYLATE, SERUM: <0.3 MG/DL (ref 15–30)
SARS-COV-2, NAAT: NOT DETECTED
SODIUM BLD-SCNC: 137 MMOL/L (ref 136–145)
TOTAL PROTEIN: 7.5 G/DL (ref 6.4–8.2)
VALPROIC ACID LEVEL: <2.8 UG/ML (ref 50–100)
WBC # BLD: 8.1 K/UL (ref 4–11)

## 2022-07-05 PROCEDURE — 99285 EMERGENCY DEPT VISIT HI MDM: CPT

## 2022-07-05 PROCEDURE — 80164 ASSAY DIPROPYLACETIC ACD TOT: CPT

## 2022-07-05 PROCEDURE — 80143 DRUG ASSAY ACETAMINOPHEN: CPT

## 2022-07-05 PROCEDURE — 83735 ASSAY OF MAGNESIUM: CPT

## 2022-07-05 PROCEDURE — 6370000000 HC RX 637 (ALT 250 FOR IP): Performed by: STUDENT IN AN ORGANIZED HEALTH CARE EDUCATION/TRAINING PROGRAM

## 2022-07-05 PROCEDURE — 6370000000 HC RX 637 (ALT 250 FOR IP): Performed by: EMERGENCY MEDICINE

## 2022-07-05 PROCEDURE — 82077 ASSAY SPEC XCP UR&BREATH IA: CPT

## 2022-07-05 PROCEDURE — 80053 COMPREHEN METABOLIC PANEL: CPT

## 2022-07-05 PROCEDURE — 87635 SARS-COV-2 COVID-19 AMP PRB: CPT

## 2022-07-05 PROCEDURE — 85025 COMPLETE CBC W/AUTO DIFF WBC: CPT

## 2022-07-05 PROCEDURE — 80179 DRUG ASSAY SALICYLATE: CPT

## 2022-07-05 PROCEDURE — 1240000000 HC EMOTIONAL WELLNESS R&B

## 2022-07-05 PROCEDURE — 6370000000 HC RX 637 (ALT 250 FOR IP)

## 2022-07-05 PROCEDURE — 36415 COLL VENOUS BLD VENIPUNCTURE: CPT

## 2022-07-05 RX ORDER — OLANZAPINE 10 MG/1
10 TABLET ORAL NIGHTLY PRN
COMMUNITY

## 2022-07-05 RX ORDER — DIVALPROEX SODIUM 500 MG/1
500 TABLET, DELAYED RELEASE ORAL 2 TIMES DAILY
Status: DISCONTINUED | OUTPATIENT
Start: 2022-07-05 | End: 2022-07-06 | Stop reason: HOSPADM

## 2022-07-05 RX ORDER — DIPHENHYDRAMINE HYDROCHLORIDE 50 MG/ML
50 INJECTION INTRAMUSCULAR; INTRAVENOUS EVERY 6 HOURS PRN
Status: DISCONTINUED | OUTPATIENT
Start: 2022-07-05 | End: 2022-07-06 | Stop reason: HOSPADM

## 2022-07-05 RX ORDER — LORAZEPAM 1 MG/1
1 TABLET ORAL 2 TIMES DAILY PRN
COMMUNITY

## 2022-07-05 RX ORDER — OLANZAPINE 5 MG/1
10 TABLET, ORALLY DISINTEGRATING ORAL ONCE
Status: COMPLETED | OUTPATIENT
Start: 2022-07-05 | End: 2022-07-05

## 2022-07-05 RX ORDER — LORAZEPAM 1 MG/1
1 TABLET ORAL 2 TIMES DAILY PRN
Status: DISCONTINUED | OUTPATIENT
Start: 2022-07-05 | End: 2022-07-06 | Stop reason: HOSPADM

## 2022-07-05 RX ORDER — OLANZAPINE 10 MG/1
10 TABLET ORAL NIGHTLY
Status: DISCONTINUED | OUTPATIENT
Start: 2022-07-05 | End: 2022-07-06 | Stop reason: HOSPADM

## 2022-07-05 RX ORDER — MELOXICAM 15 MG/1
15 TABLET ORAL DAILY PRN
Status: DISCONTINUED | OUTPATIENT
Start: 2022-07-05 | End: 2022-07-06 | Stop reason: HOSPADM

## 2022-07-05 RX ORDER — LORAZEPAM 2 MG/1
2 TABLET ORAL ONCE
Status: COMPLETED | OUTPATIENT
Start: 2022-07-05 | End: 2022-07-05

## 2022-07-05 RX ORDER — HYDROXYZINE 50 MG/1
50 TABLET, FILM COATED ORAL 3 TIMES DAILY PRN
Status: DISCONTINUED | OUTPATIENT
Start: 2022-07-05 | End: 2022-07-05

## 2022-07-05 RX ORDER — ACETAMINOPHEN 325 MG/1
650 TABLET ORAL EVERY 4 HOURS PRN
Status: DISCONTINUED | OUTPATIENT
Start: 2022-07-05 | End: 2022-07-06 | Stop reason: HOSPADM

## 2022-07-05 RX ORDER — POLYETHYLENE GLYCOL 3350 17 G
2 POWDER IN PACKET (EA) ORAL
Status: DISCONTINUED | OUTPATIENT
Start: 2022-07-05 | End: 2022-07-06 | Stop reason: HOSPADM

## 2022-07-05 RX ORDER — TRAZODONE HYDROCHLORIDE 50 MG/1
50 TABLET ORAL NIGHTLY PRN
Status: DISCONTINUED | OUTPATIENT
Start: 2022-07-05 | End: 2022-07-06 | Stop reason: HOSPADM

## 2022-07-05 RX ORDER — LORAZEPAM 2 MG/ML
2 CONCENTRATE ORAL ONCE
Status: DISCONTINUED | OUTPATIENT
Start: 2022-07-05 | End: 2022-07-05

## 2022-07-05 RX ORDER — OLANZAPINE 10 MG/1
10 INJECTION, POWDER, LYOPHILIZED, FOR SOLUTION INTRAMUSCULAR EVERY 8 HOURS PRN
Status: DISCONTINUED | OUTPATIENT
Start: 2022-07-05 | End: 2022-07-06 | Stop reason: HOSPADM

## 2022-07-05 RX ORDER — OLANZAPINE 5 MG/1
10 TABLET, ORALLY DISINTEGRATING ORAL EVERY 8 HOURS PRN
Status: DISCONTINUED | OUTPATIENT
Start: 2022-07-05 | End: 2022-07-06 | Stop reason: HOSPADM

## 2022-07-05 RX ADMIN — DIVALPROEX SODIUM 500 MG: 500 TABLET, DELAYED RELEASE ORAL at 21:00

## 2022-07-05 RX ADMIN — NICOTINE POLACRILEX 2 MG: 2 LOZENGE ORAL at 21:10

## 2022-07-05 RX ADMIN — OLANZAPINE 10 MG: 5 TABLET, ORALLY DISINTEGRATING ORAL at 01:31

## 2022-07-05 RX ADMIN — LORAZEPAM 2 MG: 2 TABLET ORAL at 18:33

## 2022-07-05 ASSESSMENT — PAIN - FUNCTIONAL ASSESSMENT: PAIN_FUNCTIONAL_ASSESSMENT: 0-10

## 2022-07-05 ASSESSMENT — PAIN DESCRIPTION - LOCATION: LOCATION: GENERALIZED

## 2022-07-05 ASSESSMENT — PAIN SCALES - GENERAL: PAINLEVEL_OUTOF10: 6

## 2022-07-05 NOTE — ED NOTES
Attempted to contact Demetrius Guo ([phone]), listed in pt's emergency contact's as \"spouse. \" Left JYOTI.      Rusty Bauman  07/05/22 8812

## 2022-07-05 NOTE — ED NOTES
Breakfast tray ordered for patient. Patient sleeping. Will continue to monitor patient.       Sheron Bañuelos RN  07/05/22 7052       Sheron Bañuelos RN  07/05/22 1205

## 2022-07-05 NOTE — ED NOTES
Medication list obtained from [facility] records.       Raisa Patton RN  07/05/22 39 Sabrina Perez RN  07/05/22 3559

## 2022-07-05 NOTE — ED NOTES
Patient appears asleep. Once awakes and after patient rest will assess patient. No beds available and its reported that drugs were found on patient. Patient medicated lastnight with Zyprexa therefor patient needs to sleep. Will continue to monitor patient.       Aline Gonzalez RN  07/05/22 6843 [place] Hannah Boudreaux RN  07/05/22 2898
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
## 2022-07-05 NOTE — ED NOTES
Patient remains agitated. Patient informed by Anna Rojas that he was being admitted and patient became more agitated. Patient cursing. Patient offered medications to help with his agitation. Patient refusing medications offered. Patient argumentative about everything. Patient at this time unable to be forced medications being only verbal. Patient wanting cigarette. Megan PORTILLO explained no smoking policy and offered Nicotine Patch or gum. Patient declined both and argumentative about nicotine. Megan PORTILLO attempted to calm patient. Patient made comment he does not want this nurse in room. Will continue to attempt to calm. Security near by and [employer].       Kris Lozano, MATEO  07/05/22 Devi [id number], RN  07/05/22 5412

## 2022-07-05 NOTE — ED NOTES
Level of Care Disposition: Admit    Patient was seen by ED provider and [facility]IL[facility] OF [facility] staff. The case presented to psychiatric provider on-call DANY Benson  Based on the ED evaluation and information presented to the provider by this nurse it is the recommendation of the on call psychiatric provider that inpatient hospitalization is the least restrictive environment for the patient at this time. The patient will be admitted to the inpatient unit.      Donna Gamez, RN  07/05/22 0220 [facility], RN  07/05/22 6802

## 2022-07-05 NOTE — ED PROVIDER NOTES
[address] ED  EMERGENCY DEPARTMENT ENCOUNTER      Pt Name: Angeline Figueroa  MRN: [id number]  Armsselwyngfkrystina 1990  Date of evaluation: 7/5/2022  Provider: Scotty Oneill MD    CHIEF COMPLAINT       Chief Complaint   Patient presents with    Psychiatric Evaluation     patient brought in by police, was outside with a gun, police found multiple drugs on him, hallucinating         HISTORY OF PRESENT ILLNESS   (Location/Symptom, Timing/Onset, Context/Setting, Quality, Duration, Modifying Factors, Severity)  Note limiting factors. Angeline iFgueroa is a 36 y.o. male with past medical history of seizure disorder, PTSD, depression and anxiety here today for psychiatric evaluation. The patient was brought to the emergency department today by police. They report that he was outside of apartment complex with a gun and gesticulating wildly. They ultimately found out that he was reportedly trying to \"protect his friends apartment\". The patient states that he was staying at a friend's apartment. She had left and stepped out and informed him that friends would be coming over. He notes that they came over, left, and then ultimately came back. He felt that this behavior was suspicious and became concerned that they were trying to break into the apartment. He states he got his gun and was protecting his friend's house. When police responded, the patient immediately complied and placed down his weapon. The police noted the patient's thoughts were quite scattered. He appeared to be talking to himself. He had pressured speech. In the back of the cruiser he seemed to be talking to himself and was telling the police that other people were sitting with him in the back of the squad car. They were concerned for his mental stability and brought him here for evaluation. Patient does state that he sometimes hears voices. Denies suicidal or homicidal ideations. HPI    Nursing Notes were reviewed.     REVIEW OF SYSTEMS    (2-9 systems for level 4, 10 or more for level 5)     Review of Systems    Please see HPI for pertinent positive and negative review of system findings. A full 10 system ROS was performed and otherwise negative. PAST MEDICAL HISTORY     Past Medical History:   Diagnosis Date    Seizures ([address]          SURGICAL HISTORY     No past surgical history on file. CURRENT MEDICATIONS       Previous Medications    DIAZEPAM (VALIUM) 2 MG TABLET    Take 2 mg by mouth every 12 hours. DIVALPROEX (DEPAKOTE) 500 MG DR TABLET    Take 2,000 mg by mouth daily    MELOXICAM (MOBIC) 15 MG TABLET    I po qd PRN       ALLERGIES     Gabapentin and Lamictal [lamotrigine]    FAMILY HISTORY     No family history on file. SOCIAL HISTORY       Social History     Socioeconomic History    Marital status:      Spouse name: Not on file    Number of children: Not on file    Years of education: Not on file    Highest education level: Not on file   Occupational History    Not on file   Tobacco Use    Smoking status: Current Some Day Smoker     Packs/day: 0.50    Smokeless tobacco: Current User    Tobacco comment: in process of quiting   Vaping Use    Vaping Use: Never used   Substance and Sexual Activity    Alcohol use: Not Currently    Drug use: Not Currently    Sexual activity: Yes   Other Topics Concern    Not on file   Social History Narrative    Not on file     Social Determinants of Health     Financial Resource Strain:     Difficulty of Paying Living Expenses: Not on file   Food Insecurity:     Worried About Running Out of Food in the Last Year: Not on file    [place] of Food in the Last Year: Not on file   Transportation Needs:     Lack of Transportation (Medical): Not on file    Lack of Transportation (Non-Medical):  Not on file   Physical Activity:     Days of Exercise per Week: Not on file    Minutes of Exercise per Session: Not on file   Stress:     Feeling of Stress : Not on file Social Connections:     Frequency of Communication with Friends and Family: Not on file    Frequency of Social Gatherings with Friends and Family: Not on file    Attends [employer] Services: Not on file    Active Member of Clubs or Organizations: Not on file    Attends Club or Organization Meetings: Not on file    Marital Status: Not on file   Intimate Partner Violence:     Fear of Current or Ex-Partner: Not on file    Emotionally Abused: Not on file    Physically Abused: Not on file    Sexually Abused: Not on file   Housing Stability:     Unable to Pay for Housing in the Last Year: Not on file    Number of [place] in the Last Year: Not on file    Unstable Housing in the Last Year: Not on file       SCREENINGS    [place] Coma Scale  Eye Opening: Spontaneous  Best Verbal Response: Oriented  Best Motor Response: Obeys commands  [place] Coma Scale Score: 15          PHYSICAL EXAM    (up to 7 for level 4, 8 or more for level 5)     ED Triage Vitals [07/05/22 0036]   BP Temp Temp src Heart Rate Resp SpO2 Height Weight   126/89 98.1 °F (36.7 °C) -- (!) 111 18 97 % 5' 8\" (1.727 m) 152 lb (68.9 kg)       Physical Exam    General appearance: Increased psychomotor activity  Skin:  Warm. Dry. Eye:  Extraocular movements intact. Ears, nose, mouth and throat:  Oral mucosa moist,  Neck:  Trachea midline. Heart: Slightly tachycardic  Perfusion:  intact  Respiratory:  Lungs clear to auscultation bilaterally. Respirations nonlabored. Abdominal:   Non distended. Nontender  Neurological:  Alert and oriented x 3. Moves all extremities spontaneously  Musculoskeletal:   Normal ROM, no deformities          Psychiatric: Pressured speech. Somewhat tangential.  Denies suicidal or homicidal ideations.   Endorses occasional hallucinations      DIAGNOSTIC RESULTS       Labs Reviewed   CBC WITH AUTO DIFFERENTIAL - Abnormal; Notable for the following components:       Result Value    Hemoglobin 12.9 (*) Hematocrit 37.4 (*)     All other components within normal limits   COMPREHENSIVE METABOLIC PANEL W/ REFLEX TO MG FOR LOW K - Abnormal; Notable for the following components:    Chloride 97 (*)     Glucose 120 (*)     BUN 5 (*)     CREATININE 1.4 (*)     GFR Non- 60 (*)     Albumin 5.1 (*)     All other components within normal limits   SALICYLATE LEVEL - Abnormal; Notable for the following components:    Salicylate, Serum <3.7 (*)     All other components within normal limits   ACETAMINOPHEN LEVEL - Abnormal; Notable for the following components:    Acetaminophen Level <5 (*)     All other components within normal limits   VALPROIC ACID LEVEL, TOTAL - Abnormal; Notable for the following components:    Valproic Acid Lvl <2.8 (*)     All other components within normal limits   COVID-19, RAPID   ETHANOL   MAGNESIUM   URINE DRUG SCREEN       Interpretation per the Radiologist below, if obtained/available at the time of this note:    No orders to display       All other labs/imaging were within normal range or not returned as of this dictation. EMERGENCY DEPARTMENT COURSE and DIFFERENTIAL DIAGNOSIS/MDM:   Vitals:    Vitals:    07/05/22 0036   BP: 126/89   Pulse: (!) 111   Resp: 18   Temp: 98.1 °F (36.7 °C)   SpO2: 97%   Weight: 152 lb (68.9 kg)   Height: 5' 8\" (1.727 m)       Patient presents to the emergency department today after being picked up by the police for gesticulating wildly with a gun in front of apartment complex. He states he felt like people were trying to break into his friend's apartment. He was not trying to hurt himself or others but rather to protect his friend's apartment. Significantly pressured speech here. Increased psychomotor activity. Mild tachycardia. Denies substance abuse but overall appearance highly suspicious for sympathomimetic syndrome and likely methamphetamine abuse. Urinalysis and urine drug screen pending at present.   Appears to be noncompliant with his Depakote. Resting comfortably at present. Very mild elevation of the patient's creatinine. Drinking water here. Medically cleared awaiting further observation and psychiatric disposition    MDM    CONSULTS     None    Critical Care:   None    REASSESSMENT          PROCEDURE     Unless otherwise noted below, none     Procedures      FINAL IMPRESSION      1. Hallucinations            DISPOSITION/PLAN   DISPOSITION          PATIENT REFERRED TO:  No follow-up provider specified. DISCHARGE MEDICATIONS:  New Prescriptions    No medications on file     Controlled Substances Monitoring:     No flowsheet data found.     (Please note that portions of this note were completed with a voice recognition program.  Efforts were made to edit the dictations but occasionally words are mis-transcribed.)    Shirley Santos MD (electronically signed)  Attending Emergency Physician            Raghav Doll MD  07/05/22 2412

## 2022-07-05 NOTE — ED NOTES
Patient awoken and VS obtained. Patient made growling noises at this nurse and screamed when awoken. Patient was able to be calmed down.       Srinivas Conley RN  07/05/22 [address], RN  07/05/22 4869

## 2022-07-05 NOTE — ED NOTES
Attempted to evaluate the patient, patient unable to remain awake for evaluation process.       Cuco Tipton RN  07/05/22 2017

## 2022-07-05 NOTE — ED NOTES
Appears to be asleep in treatment room. His eyes are closed, and respiration is even, and easy. Appears to be in no distress. Will continue to monitor.       Rosaar Para  07/05/22 7206

## 2022-07-05 NOTE — ED NOTES
Bed ready on [facility]. Notified them of Ativan. Security and [employer] LPC taking patient to [facility]. Patient on way out screamed to nurse Curse words and said \"I don't like her I hope she dies and I hope she dies a horrible death. \" Patient left KAREN.       Evette Kwong RN  07/05/22 8244

## 2022-07-05 NOTE — ED NOTES
Patient continues to be agitated, cursing at this staff member calling me \"fucking bitch\". Patient staff splitting and focused on this nurse. Patient now has bed on small side of [facility]. New order obtained from ED physician Dr. Louise Alonso for [address] [place] [address]. Ativan 2 mg po given per order. See AMANDEEP Neves, RN  07/05/22 1781 [facility], RN  07/05/22 9072

## 2022-07-05 NOTE — ED NOTES
Report called to [facility]. Cindy Lemos looking to see if patient can be placed in better fitting room on small side.       Areli Mohan RN  07/05/22 [address], RN  07/05/22 2881

## 2022-07-05 NOTE — ED NOTES
Spoke with [place] about possession of gun. Reports gun is in custody.       Evette Kwong RN  07/05/22 [address] [address], RN  07/05/22 3582

## 2022-07-05 NOTE — ED NOTES
Pt remains restless in assigned tx room. Warm blankets provided. Will continue to monitor.      97 [facility]  07/05/22 1024

## 2022-07-05 NOTE — ED NOTES
Appears to be resting in treatment room. No apparent distress. Will continue to monitor.        [facility]  07/05/22 2943

## 2022-07-05 NOTE — ED NOTES
Pt appears to be somewhat restless, and wakes intermittently. Will continue to monitor.      Eitan Garcia  07/05/22 6181

## 2022-07-06 VITALS
HEIGHT: 68 IN | TEMPERATURE: 97.7 F | HEART RATE: 77 BPM | SYSTOLIC BLOOD PRESSURE: 103 MMHG | RESPIRATION RATE: 16 BRPM | BODY MASS INDEX: 23.04 KG/M2 | OXYGEN SATURATION: 100 % | WEIGHT: 152 LBS | DIASTOLIC BLOOD PRESSURE: 75 MMHG

## 2022-07-06 PROBLEM — M41.50 DEGENERATIVE SCOLIOSIS: Status: ACTIVE | Noted: 2022-07-06

## 2022-07-06 PROBLEM — F43.10 PTSD (POST-TRAUMATIC STRESS DISORDER): Status: ACTIVE | Noted: 2022-07-06

## 2022-07-06 PROCEDURE — 6370000000 HC RX 637 (ALT 250 FOR IP)

## 2022-07-06 PROCEDURE — 99221 1ST HOSP IP/OBS SF/LOW 40: CPT

## 2022-07-06 PROCEDURE — 5130000000 HC BRIDGE APPOINTMENT

## 2022-07-06 PROCEDURE — 99234 HOSP IP/OBS SM DT SF/LOW 45: CPT | Performed by: PSYCHIATRY & NEUROLOGY

## 2022-07-06 RX ADMIN — NICOTINE POLACRILEX 2 MG: 2 LOZENGE ORAL at 11:46

## 2022-07-06 RX ADMIN — DIVALPROEX SODIUM 500 MG: 500 TABLET, DELAYED RELEASE ORAL at 12:11

## 2022-07-06 RX ADMIN — DIVALPROEX SODIUM 500 MG: 500 TABLET, DELAYED RELEASE ORAL at 16:05

## 2022-07-06 RX ADMIN — LORAZEPAM 1 MG: 1 TABLET ORAL at 12:35

## 2022-07-06 SDOH — ECONOMIC STABILITY: HOUSING INSECURITY

## 2022-07-06 SDOH — ECONOMIC STABILITY: TRANSPORTATION INSECURITY

## 2022-07-06 SDOH — ECONOMIC STABILITY: INCOME INSECURITY

## 2022-07-06 SDOH — HEALTH STABILITY: PHYSICAL HEALTH

## 2022-07-06 ASSESSMENT — SLEEP AND FATIGUE QUESTIONNAIRES
SLEEP PATTERN: DIFFICULTY FALLING ASLEEP;INSOMNIA;RESTLESSNESS;NIGHTMARES/TERRORS
DO YOU HAVE DIFFICULTY SLEEPING: YES
AVERAGE NUMBER OF SLEEP HOURS: 3
DO YOU USE A SLEEP AID: NO

## 2022-07-06 ASSESSMENT — ENCOUNTER SYMPTOMS: BACK PAIN: 1

## 2022-07-06 NOTE — H&P
Hospital Medicine History & Physical      PCP: Yarely Blanco MD    Date of Admission: 7/5/2022    Date of Service: Pt seen/examined on 7/6/2022      Chief Complaint:    Chief Complaint   Patient presents with    Psychiatric Evaluation     patient brought in by police, was outside with a gun, police found multiple drugs on him, hallucinating         History Of Present Illness: The patient is a 36 y.o. male with degenerative scoliosis and hx of seizures who presented to [facility] for psychosis. Patient was seen and evaluated in the ED by the ED medical provider, patient was medically cleared for admission to [facility]. This note serves as an admission medical H&P. Tobacco use: Daily vaping. States he will occasionally smoke cigarettes. ETOH use: None since 7453  Illicit drug use: THC, has medical card    Patient states he is in pain from his degenerative scoliosis. He states he takes tylenol for this and medical THC which help. He is very anxious and agitated because he does not know how long he will be kept here and he states his service dog is in a cage in the camper he was staying in and he is not sure if anyone has been able to check on it. I spoke to his RN who will reach out to the psychiatrist about this. He denies other medical complaints. Past Medical History:        Diagnosis Date    Seizures [facility])        Past Surgical History:    History reviewed. No pertinent surgical history. Medications Prior to Admission:    Prior to Admission medications    Medication Sig Start Date End Date Taking? Authorizing Provider   LORazepam (ATIVAN) 1 MG tablet Take 1 mg by mouth 2 times daily as needed for Anxiety.    Yes Historical Provider, MD   OLANZapine (ZYPREXA) 10 MG tablet Take 10 mg by mouth nightly as needed   Yes Historical Provider, MD   divalproex (DEPAKOTE) 500 MG DR tablet Take 500 mg by mouth 2 times daily Take 2 tablets 2 x day    Historical Provider, MD   meloxicam (MOBIC) 15 MG tablet I po qd PRN 9/13/19   Elisha Patel PA-C       Allergies:  Gabapentin and Lamictal [lamotrigine]    Social History:  The patient currently lives in a camper. States he was supposed to be moving to [place] today to live with a [employer] friend. TOBACCO:   reports that he has been smoking. He has been smoking about 0.50 packs per day. His smokeless tobacco use includes snuff and chew. ETOH:   has no history on file for alcohol use. Family History:   Positive as follows:    History reviewed. No pertinent family history. REVIEW OF SYSTEMS:       Constitutional: Negative for fever   HENT: Negative for sore throat   Eyes: Negative for redness   Respiratory: Negative  for dyspnea, cough   Cardiovascular: Negative for chest pain   Gastrointestinal: Negative for vomiting, diarrhea   Genitourinary: Negative for hematuria   Musculoskeletal: Negative for arthralgias. + Chronic back pain  Skin: Negative for rash   Neurological: Negative for syncope    Hematological: Negative for easy bruising/bleeding   Psychiatric/Behavorial: Per psychiatry team evaluation     PHYSICAL EXAM:    /75   Pulse 77   Temp 97.7 °F (36.5 °C) (Oral)   Resp 16   Ht 5' 8\" (1.727 m)   Wt 152 lb (68.9 kg)   SpO2 100%   BMI 23.11 kg/m²     Gen: Moderate distress. Alert. Anxious and agitated. Eyes: PERRL. No sclera icterus. No conjunctival injection. ENT: No discharge. Pharynx clear. Neck: No JVD. Trachea midline. Resp: No accessory muscle use. No crackles. No wheezes. No rhonchi. CV: Tachycardic rate. Regular rhythm. No murmur. No rub. No edema. GI: Non-tender. Non-distended. Normal bowel sounds. Skin: Warm and dry. No nodule on exposed extremities. No rash on exposed extremities. M/S: No cyanosis. No joint deformity. No clubbing. Neuro: Awake. No focal neurologic deficit on exam.  Cranial nerves II through XII intact. Patient is able to ambulate without difficulty.   Psych: Per psychiatry team evaluation CBC:   Recent Labs     07/05/22 0056   WBC 8.1   HGB 12.9*   HCT 37.4*   MCV 88.3        BMP:   Recent Labs     07/05/22 0056      K 3.5   CL 97*   CO2 25   BUN 5*   CREATININE 1.4*     LIVER PROFILE:   Recent Labs     07/05/22 0056   AST 27   ALT 14   BILITOT 0.4   ALKPHOS 69       CULTURES  Results for Allan Virgen (MRN [id number]) as of 7/6/2022 13:08   Ref.  Range 7/5/2022 00:56   SARS-CoV-2, NAAT Latest Ref Range: Not Detected  Not Detected       EKG:  I have reviewed the EKG with the following interpretation:   NA    RADIOLOGY  No orders to display        ASSESSMENT/PLAN:  #Psychosis  - per psychiatry team    #RAINA    -Baseline Cr < 1  -1.4 on admission  -No fluids were given in the ED  -Recheck BMP    #Degenerative scoliosis  -Home mobic held with RAINA  -States he takes tylenol and medical THC  -Continue tylenol here    #Seizure disorder  -States he was diagnosed with this at the [place] in January  -Continue depakote  -Seizure precautions    #Medical THC  -For above    #Daily vaping and nicotine dependence  -Recommend cessation  -Declines nicotine patch    Jalen Richards PA-C  7/6/2022 1:08 PM

## 2022-07-06 NOTE — BH NOTE
[facility]  Discharge Note    Pt discharged with followings belongings:   Dental Appliances: None  Vision - Corrective Lenses: None  Hearing Aid: None  Jewelry: Necklace  Body Piercings Removed: N/A  Clothing: Shirt,Socks,Shorts,Undergarments,Footwear  Other Valuables: Money,Wallet,Keys   Valuables returned to patient. Patient education on aftercare instructions: yes, by Celestina Contreras LPN  Information faxed [address] [place] [address] Drive [phone]  by Demetrius Drummond LPN  at 5:37 PM .Patient verbalize understanding of AVS:  yes. Status EXAM upon discharge:  Mental Status and Behavioral Exam  Normal: No  Level of Assistance: Independent/Self  Facial Expression: Hostile,Sad,Worried  Affect: Unstable  Level of Consciousness: Alert  Frequency of Checks: 4 times per hour, close  Mood:Normal: No  Mood: Labile,Angry,Irritable  Motor Activity:Normal: No  Motor Activity: Increased  Eye Contact: Good  Observed Behavior: Aggressive,Threatening,Agitated,Guarded  Sexual Misconduct History: Past - no  Preception: Holman to person,[place] to time,[place] to place,[place] to situation  Attention:Normal: No  Attention: Hyperalert  Thought Processes: Flight of ideas  Thought Content:Normal: No  Thought Content: Delusions,Paranoia,Preoccupations  Depression Symptoms: No problems reported or observed. Anxiety Symptoms: Generalized  Noreen Symptoms: Pressured speech,Increased energy,Labile,Poor judgment,Psychomotor agitation  Hallucinations: Auditory (comment),Visual (comment)  Delusions: Yes  Delusions: Paranoid,Persecutory  Memory:Normal: No  Memory: Poor recent  Insight and Judgment: No  Insight and Judgment: Poor judgment,Poor insight      Metabolic Screening:    No results found for: LABA1C    No results found for: CHOL  No results found for: TRIG  No results found for: HDL  No components found for: LDLCAL  No results found for: [address] [facility] Appointment completed: Reviewed Discharge Instructions with patient.     Patient verbalizes understanding and agreement with the discharge plan using the teachback method.      Referral for Outpatient Tobacco Cessation Counseling, upon discharge (essence X if applicable and completed):    ( )  Hospital staff assisted patient to call Quit Line or faxed referral                                   during hospitalization                  ( )  Recognizing danger situations (included triggers and roadblocks), if not completed on admission                    ( )  Coping skills (new ways to manage stress, exercise, relaxation techniques, changing routine, distraction), if not completed on admission                                                           ( )  Basic information about quitting (benefits of quitting, techniques in how to quit, available resources, if not completed on admission  ( ) [facility]   (x ) Patient refused referral  (x ) Patient refused counseling  (x ) Patient refused smoking cessation medication upon discharge    Vaccinations (essence X if applicable and completed):  ( ) Patient states already received influenza vaccine elsewhere  ( ) Patient received influenza vaccine during this hospitalization  (x ) Patient refused influenza vaccine at this time
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
## 2022-07-06 NOTE — PROGRESS NOTES
Patient Has been mostly lying in his bed, with his head covered up. Writer went into patient's room to give him his scheduled medications. Patient did allow staff, to obtain his vitals. Patient initially declined to talk. Patient then yelled out,\"I not doing anything until you tell me why I was commited to this place. \" Maya Terry got a copy of his statement of belief & read it to him. Patient started yelling,\" It's all a bunch of god damn lies. I wasn't waving a gun. I had the gun on me, in the back of my pants. \" Patient was illogical & having difficulty understanding what writer was saying. Patient initially declined to take his scheduled depakote & zyprexa. \" I want you all to see me get really bad, when I don't take my meds & then you will realize that I wasn't that bad when I came in. \"  Writer explained to patient that Adriano Amezcua had zyprexa & depakote to give him & explained what zyprexa was twice, because he stated that he doesn't take zyprexa. Patient yelled,\"Just give me the meds & I'll show you that they don't help. \" Patient put the 2 pills in his hand & said,\"You lied, my depakote isn't 2 different pills. \" Patient was again instructed that 1 was depakote & 1 was zyprexa. . Patient finally took the depakote & not the zyprexa, at 21:00. After staff left, patient started yelleng in his room & it sounded like he was moving furniture. Writer & another staff checked on him & patient stated,\"What do you want? \" Patient was instructed that we had to make sure he was safe. No further out burst. Writer was unable to complete patient's admission, due to patient being labile, easily agitated, with disorganized thoughts.  Haleigh Gracia R.N.
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
## 2022-07-06 NOTE — H&P
HISTORY AND PHYSICAL             Date: 7/6/2022        Patient Name: Emiliano Sheth     YOB: 1990      Age:  36 y.o. Chief Complaint     Chief Complaint   Patient presents with    Psychiatric Evaluation     patient brought in by police, was outside with a gun, police found multiple drugs on him, hallucinating          History Obtained From   patient, electronic medical record    History of Present Illness     Emiliano Sheth is a 36year old [address] [facility] who was brought to the hospital on a statement of belief by police. Statement of belief was dated on 7/4 and timed at 12:30 PM.  According to the statement of belief, he was found outside of an apartment building with a firearm, yelling. He was in possession of MJ, \"wax\" and a white crystal substance. He said that he observed people breaking into his friend's apartment and saw people with him inside the police cruiser. He was brought to the hospital seen in the [facility]. Where he was observed to be agitated, shouting and irritable. He was held in the [facility] due to lack of beds. He finally slept, and awoke at around noon on 7/6. Today, he is able to be interviewed and is able to be calm. Initially, he was upset because he did not know why he was being held. Once this information was given, he calmly was able to be interviewed. He recalls being at the apartment building and being picked up by the police. He recalls yelling at the police \"because I'm a Black man in [facility],\" and had a gun because \"it's legal for me to have one. \"  He does not recall being paranoid, seeing things. He admits to using medical marijuana and \"dab. \"    Today he denies paranoia, hallucinations, symptoms of depression, boo,  Suicidal or homicidal ideation. He requests to be discharged. He has been offered a job in [employer] [place] [employer], [place] by a fellow [address] [place] [address] at [employer].     Past Medical History     Past Medical History:   Diagnosis Date    Chronic back pain  Depression     Medical marijuana use     PTSD (post-traumatic stress disorder)     Seizures (HCC)       Past Psych History:  History of depression, ? Bipolar disorder?, and PTSD. Denies psychiatric hospitalizations or suicide attempts. Jennifer  psychiatric care at the [facility]    Past Surgical History   History reviewed. No pertinent surgical history. Medications Prior to Admission     Prior to Admission medications    Medication Sig Start Date End Date Taking? Authorizing Provider   LORazepam (ATIVAN) 1 MG tablet Take 1 mg by mouth 2 times daily as needed for Anxiety. Yes Historical Provider, MD   OLANZapine (ZYPREXA) 10 MG tablet Take 10 mg by mouth nightly as needed   Yes Historical Provider, MD   divalproex (DEPAKOTE) 500 MG DR tablet Take 500 mg by mouth 2 times daily Take 2 tablets 2 x day    Historical Provider, MD   meloxicam (MOBIC) 15 MG tablet I po qd PRN 9/13/19   Chemo Kevin PA-C        Allergies   Gabapentin and Lamictal [lamotrigine]    Social History     Social History     Tobacco History     Smoking Status  Current Some Day Smoker Smoking Frequency  0.5 packs/day    Smokeless Tobacco Use  Current User Smokeless Tobacco Type  Snuff, Chew    Tobacco Comment  in process of quiting          Alcohol History     Alcohol Use Status  Not Asked Comment  SEMAJ, patient was labile & uncooperative          Drug Use     Drug Use Status  Not Currently Types  Marijuana Estil Catena) Comment  Patient reported having a medical marijuana card          Sexual Activity     Sexually Active  Not Asked Comment  SEMAJ, patient was labile & uncooperative              From [place]. His mother is an . He went to private school. 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 He is now disabled and receives $500 a month in disability. He has PTSD due to having seen a  fall out of an airplane. He is  and his ex has his kids.   In January he started having seizures and then was no longer able to drive. He then was no longer able to see his kids. 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 Family History   History reviewed. No pertinent family history. Review of Systems   Review of Systems   Musculoskeletal: Positive for back pain. Psychiatric/Behavioral: Positive for agitation and behavioral problems. Negative for dysphoric mood and suicidal ideas. All other systems reviewed and are negative.       Physical Exam   /75   Pulse 77   Temp 97.7 °F (36.5 °C) (Oral)   Resp 16   Ht 5' 8\" (1.727 m)   Wt 152 lb (68.9 kg)   SpO2 100%   BMI 23.11 kg/m²     MENTAL STATUS EXAM      General appearance:  [x]  appears age, []  appears older than stated age,     [x]  adequately dressed and groomed, [] disheveled,                []  in no acute distress, [x] appears mildly distressed at the beginning of the interview but calmed down promptly    []  Other:      MUSCULOSKELETAL:   Gait:   [x] normal, [] antalgic, [] unsteady, [] in bed, gait not evaluated   Station:  [x] erect, [] sitting, [] recumbent, [] other        Strength/tone:  [x] muscle strength and tone appear consistent with age and condition     [] atrophy      [] abnormal movements  PSYCHIATRIC:    Relatedness:  [x] cooperative, [] guarded, [] indifferent, [] hostile,      [] sedated  Speech:  [x] normal prosody, [] pressured, [] decreased volume,    [] slurred, [] halting, [] slowed, [] Loud     [] echolalia, [] incoherent, [] stuttering   Eye contact:  [x] direct, [] avoidant, [] intense  Kinetics:  [x] normal, [] increased, [] decreased  Mood:   [] euthymic, [] depressed, [x] anxious, [] irritable,     [] labile  Affect:   [] normal range, [x] constricted, [] depressed, [] anxious,     [] angry, [] blunted, [] flat  Hallucinations  [x] denies, [] auditory,  [] visual,  [] olfactory, [] tactile  Delusions  [x] none, [] grandiose,  [] jealous,  [] persecutory,  [] somatic,     [] bizarre  Preoccupations  [] none, [] violence, [] obsessions, [] other     Suicidal ideation  [x] denies, [] endorses  Homicidal ideation [x] denies, [] endorses  Thought process: [x] logical, [] circumstantial, [] tangential,      [] concrete, [] disorganized  Associations:  [x] logical and coherent, [] loosening  Insight:   [] good, [x] fair, [] poor  Judgment:  [] good, [x] fair, [] poor  Attention and concentration:     [x] intact, [] limited, [] able to focus on interview,     [] grossly impaired  Orientation:  [x] person, place, time, situation     [] disoriented to:     Memory:  Remote memory [x] intact, [] impaired     Recent memory  [x] intact, [] impaired            Labs    No results found for this or any previous visit (from the past 24 hour(s)). Imaging/Diagnostics Last 24 Hours   No results found. Assessment      Hospital Problems           Last Modified POA    * (Principal) Psychosis, unspecified psychosis type ([facility] [address] 7/6/2022 Yes    Degenerative scoliosis 7/6/2022 Yes    Seizure disorder ([facility] [address] 7/6/2022 Yes    Medical marijuana use 7/6/2022 Yes    Current every day vaping 7/6/2022 Yes    Nicotine dependence, uncomplicated 1/8/3835 Yes          Patient was picked up by police in a psychotic state the night of 7/3. He awakens today is a calm state with no psychosis. He is neither manic nor depressed and is neither suicidal nor homicidal.  He admits to having used synthetic cannabinoids; this was likely the cause of his psychosis. At this time, he asks for discharge. He is calm and is OK to discharge. He is future-oriented and is well-connected at the [place]. He was instructed to continue his current medications and cautioned him about cannabinoids. Plan   1. Admit/Discharge  2. Continue meds  3.  Follow-up at the [place]    Consultations Ordered:  IP CONSULT TO HOSPITALIST    Electronically signed by Jazmín Galeas MD on 7/6/22 at 2:44 PM EDT

## 2022-07-06 NOTE — PROGRESS NOTES
Behavioral Services  Medicare Certification Upon Admission    I certify that this patient's inpatient psychiatric hospital admission is medically necessary for:    [x] (1) Treatment which could reasonably be expected to improve this patient's condition,       [x] (2) Or for diagnostic study;     AND     [x](2) The inpatient psychiatric services are provided while the individual is under the care of a physician and are included in the individualized plan of care. Estimated length of stay/service 4 days    Plan for post-hospital care:  Outpatient [address]    Electronically signed by Jazmín Galeas MD on 7/6/2022 at 2:07 PM

## 2022-07-06 NOTE — PROGRESS NOTES
[facility]  Admission Note     Admission Type:   Admission Type: Involuntary    Reason for admission:  Reason for Admission: Psychosis    PATIENT STRENGTHS:       Patient Strengths and Limitations:       Addictive Behavior:   Addictive Behavior  In the Past 3 Months, Have You Felt or Has Someone Told You That You Have a Problem With  :  (SEMAJ-Patient was labile, easily agitated & uncooperative)    Medical Problems:   Past Medical History:   Diagnosis Date    Seizures ([address]        Status EXAM:  Mental Status and Behavioral Exam  Normal: No  Level of Assistance: Independent/Self  Facial Expression: Hostile,Exaggerated  Affect: Unstable  Level of Consciousness: Alert  Frequency of Checks: 4 times per hour, close  Mood:Normal: No  Mood: Labile,Suspicious,Anxious,Irritable  Motor Activity:Normal: No  Motor Activity: Agitated  Eye Contact: Fair  Observed Behavior: Agitated,Hostile,Preoccupied  Sexual Misconduct History:  (SEMAJ, patient was labile & uncooperative)  Preception: [place] to person (SEMAJ, patient was labile & uncooperative)  Attention:Normal: No  Attention: Distractible,Unable to concentrate  Thought Processes: Flight of ideas  Thought Content:Normal: No  Thought Content: Paranoia,Preoccupations  Depression Symptoms: No problems reported or observed.   Anxiety Symptoms: Generalized  Noreen Symptoms: Flight of ideas  Hallucinations: None (Patient deneied, it was reported that patient was having auditory hallucinations, prior to admission)  Delusions: Yes  Delusions: Paranoid,Persecutory  Memory:Normal:  (SEMAJ, patient was labile & uncooperative)  Insight and Judgment: No  Insight and Judgment: Poor judgment,Poor insight,Unrealistic    Tobacco Screening:  Practical Counseling, on admission, essence X, if applicable and completed (first 3 are required if patient doesn't refuse):            ( )  Recognizing danger situations (included triggers and roadblocks)                    ( )  Coping skills (new ways to manage stress, exercise, relaxation techniques, changing routine, distraction)                                                           ( )  Basic information about quitting (benefits of quitting, techniques in how to quit, available resources  ( ) Referral for counseling faxed to Laura                                           ( ) Patient refused counseling  ( ) Patient has not smoked in the last 30 days    Metabolic Screening:    No results found for: LABA1C    No results found for: CHOL  No results found for: TRIG  No results found for: HDL  No components found for: LDLCAL  No results found for: LABVLDL      Body mass index is 23.11 kg/m². BP Readings from Last 2 Encounters:   07/05/22 112/77   06/24/22 (!) 100/50           Pt admitted with followings belongings:  Dental Appliances: None  Vision - Corrective Lenses: None  Hearing Aid: None  Jewelry: Necklace  Body Piercings Removed: N/A  Clothing: Shirt,Socks,Shorts,Undergarments,Footwear  Other Valuables: Money,Wallet,Keys     Patient's home medications were none. Patient oriented to surroundings and program expectations and copy of patient rights given. Received admission packet:  No, due to patient being agitated & uncooperative. Consents reviewed, signed  No, due to patient being agitated & uncooperative. Patient verbalize understanding:  N/A. Patient education on precautions: Yes.                    Eugene Mcfarland RN

## 2022-07-06 NOTE — GROUP NOTE
Group Therapy Note    Date: 7/6/2022    Group Start Time: 1100  Group End Time: 1200  Group Topic: Music Therapy    [address] [place] [address]        Group Therapy Note    Topic: Exploring and Reframing Perspectives     Mode of Intervention: [place] Analysis, Verbal discussion    Song Used: Have You Ever Seen the [employer]    Attendees: 14         Notes: This pt was present and engaged across discussions, collaborating with peers in exploration of individual experiences and ways to reframe perspective. Group members reflected while discussing challenges of poor self-esteem and negative perceptions influencing perspectives long-term, environmental impacts on perspectives and positive coping styles. No needs verbalized at close of session, with group members selecting \"One thought at a time\" to summarize information learned during session    Status After Intervention:  Improved    Participation Level:  Active Listener and Interactive    Participation Quality: Sharing and Supportive      Speech:  normal      Thought Process/Content: Linear      Affective Functioning: Congruent      Mood: euthymic      Level of consciousness:  Alert and Attentive      Response to Learning: Progressing to goal      Endings: None Reported    Modes of Intervention: Education, Support, Socialization, Exploration, Activity and Media      Discipline Responsible: Psychoeducational Specialist      Signature:  Joe Vazquez MM, MT-BC

## 2023-07-25 NOTE — ED NOTES
likely\" in need of hospitalization, and if Carrol Hayward knows him at all,\" is most likely homeless, and \"definitely not well. \"            Gabrielle Ramirez  07/05/22 9569 No. NEIDA screening performed.  STOP BANG Legend: 0-2 = LOW Risk; 3-4 = INTERMEDIATE Risk; 5-8 = HIGH Risk